# Patient Record
Sex: FEMALE | Race: WHITE | Employment: OTHER | URBAN - METROPOLITAN AREA
[De-identification: names, ages, dates, MRNs, and addresses within clinical notes are randomized per-mention and may not be internally consistent; named-entity substitution may affect disease eponyms.]

---

## 2018-01-15 ENCOUNTER — OFFICE VISIT (OUTPATIENT)
Dept: ENDOCRINOLOGY | Age: 58
End: 2018-01-15

## 2018-01-15 VITALS
DIASTOLIC BLOOD PRESSURE: 66 MMHG | WEIGHT: 168 LBS | HEIGHT: 66 IN | OXYGEN SATURATION: 98 % | SYSTOLIC BLOOD PRESSURE: 110 MMHG | HEART RATE: 95 BPM | BODY MASS INDEX: 27 KG/M2

## 2018-01-15 DIAGNOSIS — E04.2 MULTINODULAR GOITER: ICD-10-CM

## 2018-01-15 DIAGNOSIS — I10 ESSENTIAL HYPERTENSION: ICD-10-CM

## 2018-01-15 DIAGNOSIS — E55.9 VITAMIN D DEFICIENCY: ICD-10-CM

## 2018-01-15 DIAGNOSIS — E03.9 HYPOTHYROIDISM (ACQUIRED): Primary | ICD-10-CM

## 2018-01-15 DIAGNOSIS — E78.2 MIXED HYPERLIPIDEMIA: ICD-10-CM

## 2018-01-15 DIAGNOSIS — E66.3 OVERWEIGHT (BMI 25.0-29.9): ICD-10-CM

## 2018-01-15 DIAGNOSIS — E28.2 PCOS (POLYCYSTIC OVARIAN SYNDROME): ICD-10-CM

## 2018-01-15 PROCEDURE — 99203 OFFICE O/P NEW LOW 30 MIN: CPT | Performed by: INTERNAL MEDICINE

## 2018-01-15 RX ORDER — PANTOPRAZOLE SODIUM 40 MG/1
40 TABLET, DELAYED RELEASE ORAL
COMMUNITY

## 2018-01-15 RX ORDER — SIMVASTATIN 10 MG
10 TABLET ORAL
COMMUNITY

## 2018-01-15 RX ORDER — TRIAMTERENE AND HYDROCHLOROTHIAZIDE 37.5; 25 MG/1; MG/1
0.5 TABLET ORAL
COMMUNITY
Start: 2017-11-09

## 2018-01-15 RX ORDER — LEVOTHYROXINE AND LIOTHYRONINE 38; 9 UG/1; UG/1
60 TABLET ORAL
COMMUNITY
End: 2018-01-15 | Stop reason: SDUPTHER

## 2018-01-15 RX ORDER — VALSARTAN 160 MG/1
160 TABLET ORAL
COMMUNITY
Start: 2017-11-09 | End: 2020-06-15 | Stop reason: ALTCHOICE

## 2018-01-15 RX ORDER — CARVEDILOL 6.25 MG/1
TABLET ORAL
COMMUNITY
Start: 2018-01-15

## 2018-01-15 RX ORDER — LEVOTHYROXINE AND LIOTHYRONINE 38; 9 UG/1; UG/1
60 TABLET ORAL DAILY
Qty: 30 TABLET | Refills: 5 | Status: SHIPPED | OUTPATIENT
Start: 2018-01-15 | End: 2018-06-11 | Stop reason: SDUPTHER

## 2018-01-15 RX ORDER — LEVOTHYROXINE AND LIOTHYRONINE 9.5; 2.25 UG/1; UG/1
7.5 TABLET ORAL
COMMUNITY
End: 2018-01-15 | Stop reason: SDUPTHER

## 2018-01-15 RX ORDER — BUDESONIDE AND FORMOTEROL FUMARATE DIHYDRATE 160; 4.5 UG/1; UG/1
2 AEROSOL RESPIRATORY (INHALATION)
COMMUNITY

## 2018-01-15 RX ORDER — LEVOTHYROXINE AND LIOTHYRONINE 9.5; 2.25 UG/1; UG/1
7.5 TABLET ORAL DAILY
Qty: 30 TABLET | Refills: 5 | Status: SHIPPED | OUTPATIENT
Start: 2018-01-15 | End: 2018-06-03 | Stop reason: SDUPTHER

## 2018-01-15 RX ORDER — RANITIDINE 150 MG/1
150 TABLET ORAL
COMMUNITY
End: 2020-06-15 | Stop reason: ALTCHOICE

## 2018-01-15 ASSESSMENT — PATIENT HEALTH QUESTIONNAIRE - PHQ9
1. LITTLE INTEREST OR PLEASURE IN DOING THINGS: 0
SUM OF ALL RESPONSES TO PHQ9 QUESTIONS 1 & 2: 0
SUM OF ALL RESPONSES TO PHQ QUESTIONS 1-9: 0
2. FEELING DOWN, DEPRESSED OR HOPELESS: 0

## 2018-01-15 NOTE — PROGRESS NOTES
Hypothyroidism     PCOS (polycystic ovarian syndrome)      There are no active problems to display for this patient.     Past Surgical History:   Procedure Laterality Date     SECTION      HYSTERECTOMY, TOTAL ABDOMINAL       Family History   Problem Relation Age of Onset    Other Mother      metabolic encephalopathy     COPD Father    [employer] Father      prostate    Coronary Art Dis Brother     No Known Problems Son     No Known Problems Son      Social History     Social History    Marital status:      Spouse name: N/A    Number of children: N/A    Years of education: N/A     Social History Main Topics    Smoking status: Former Smoker    Smokeless tobacco: Former User    Alcohol use No    Drug use: No    Sexual activity: Yes     Partners: Male     Other Topics Concern    None     Social History Narrative    None     Current Outpatient Prescriptions   Medication Sig Dispense Refill    pantoprazole (PROTONIX) 40 MG tablet Take 40 mg by mouth      ranitidine (ZANTAC) 150 MG tablet Take 150 mg by mouth      thyroid (ARMOUR) 60 MG tablet Take 60 mg by mouth      metFORMIN (GLUCOPHAGE) 500 MG tablet Take 500 mg by mouth      carvedilol (COREG) 6.25 MG tablet TAKE 1 TABLET BY MOUTH TWICE DAILY      thyroid (ARMOUR) 15 MG tablet Take 7.5 mg by mouth      Multiple Vitamins-Minerals (MULTIVITAMIN ADULT PO) Take 1 tablet by mouth      triamterene-hydrochlorothiazide (MAXZIDE-25) 37.5-25 MG per tablet Take 0.5 tablets by mouth      valsartan (DIOVAN) 160 MG tablet Take 160 mg by mouth      simvastatin (ZOCOR) 10 MG tablet Take 10 mg by mouth      Calcium Carb-Cholecalciferol (CALCIUM CARBONATE-VITAMIN D3 PO) Take 2 tablets by mouth      budesonide-formoterol (SYMBICORT) 160-4.5 MCG/ACT AERO Inhale 2 puffs into the lungs      Estrogens, Conjugated (PREMARIN VA) Place vaginally      Minoxidil (ROGAINE EX) Apply topically      Polyethylene Glycol 3350 (MIRALAX PO) Take by mouth tablet; Refill: 5  - thyroid (ARMOUR) 60 MG tablet; Take 1 tablet by mouth daily  Dispense: 30 tablet; Refill: 5    2. Multinodular goiter    - US Head Neck Soft Tissue Thyroid; Future    3. Essential hypertension    - carvedilol (COREG) 6.25 MG tablet; TAKE 1 TABLET BY MOUTH TWICE DAILY  - triamterene-hydrochlorothiazide (MAXZIDE-25) 37.5-25 MG per tablet; Take 0.5 tablets by mouth  - valsartan (DIOVAN) 160 MG tablet; Take 160 mg by mouth    4. Mixed hyperlipidemia    - simvastatin (ZOCOR) 10 MG tablet; Take 10 mg by mouth  - Lipid Panel; Future    5. Overweight (BMI 25.0-29. 9)  Diet, exercise    6. Vitamin D deficiency  Continue vitamin D    7. PCOS (polycystic ovarian syndrome)    - metFORMIN (GLUCOPHAGE) 500 MG tablet; Take 1 tablet by mouth 2 times daily (with meals)  Dispense: 60 tablet; Refill: 5      Reviewed and/or ordered clinical lab results Yes  Reviewed and/or ordered radiology tests Yes   Reviewed and/or ordered other diagnostic tests No  Discussed test results with performing physician No  Independently reviewed image, tracing, or specimen No  Made a decision to obtain old records Yes  Reviewed and summarized old records Yes  Has hypothyroidism, on [place]. Obtained history from other than patient No    Mynor Cortez was counseled regarding symptoms of current diagnosis, course and complications of disease if inadequately treated, side effects of medications, diagnosis, treatment options, and prognosis, risks, benefits, complications, and alternatives of treatment, labs, imaging and other studies and treatment targets and goals. She understands instructions and counseling. Return in about 6 months (around 7/15/2018) for thyroid problems.

## 2018-04-09 ENCOUNTER — TELEPHONE (OUTPATIENT)
Dept: ENDOCRINOLOGY | Age: 58
End: 2018-04-09

## 2018-04-09 DIAGNOSIS — E28.2 PCOS (POLYCYSTIC OVARIAN SYNDROME): ICD-10-CM

## 2018-06-03 DIAGNOSIS — E03.9 HYPOTHYROIDISM (ACQUIRED): ICD-10-CM

## 2018-06-05 RX ORDER — THYROID,PORK 15 MG
TABLET ORAL
Qty: 15 TABLET | Refills: 0 | Status: SHIPPED | OUTPATIENT
Start: 2018-06-05 | End: 2018-06-11 | Stop reason: SDUPTHER

## 2018-06-11 ENCOUNTER — TELEPHONE (OUTPATIENT)
Dept: ENDOCRINOLOGY | Age: 58
End: 2018-06-11

## 2018-06-11 DIAGNOSIS — E03.9 HYPOTHYROIDISM (ACQUIRED): ICD-10-CM

## 2018-06-11 RX ORDER — THYROID,PORK 15 MG
TABLET ORAL
Qty: 15 TABLET | Refills: 0 | Status: CANCELLED | OUTPATIENT
Start: 2018-06-11

## 2018-06-11 RX ORDER — THYROID,PORK 15 MG
TABLET ORAL
Qty: 60 TABLET | Refills: 3 | Status: SHIPPED | OUTPATIENT
Start: 2018-06-11 | End: 2018-07-11 | Stop reason: SDUPTHER

## 2018-06-12 RX ORDER — THYROID 60 MG
TABLET ORAL
Qty: 90 TABLET | Refills: 0 | Status: SHIPPED | OUTPATIENT
Start: 2018-06-12 | End: 2018-07-11 | Stop reason: SDUPTHER

## 2018-07-11 ENCOUNTER — OFFICE VISIT (OUTPATIENT)
Dept: ENDOCRINOLOGY | Age: 58
End: 2018-07-11

## 2018-07-11 VITALS
HEIGHT: 66 IN | WEIGHT: 164.2 LBS | HEART RATE: 84 BPM | SYSTOLIC BLOOD PRESSURE: 105 MMHG | DIASTOLIC BLOOD PRESSURE: 60 MMHG | BODY MASS INDEX: 26.39 KG/M2 | TEMPERATURE: 98 F

## 2018-07-11 DIAGNOSIS — E55.9 VITAMIN D DEFICIENCY: ICD-10-CM

## 2018-07-11 DIAGNOSIS — E04.2 MULTINODULAR GOITER: ICD-10-CM

## 2018-07-11 DIAGNOSIS — E66.3 OVERWEIGHT (BMI 25.0-29.9): ICD-10-CM

## 2018-07-11 DIAGNOSIS — E78.2 MIXED HYPERLIPIDEMIA: ICD-10-CM

## 2018-07-11 DIAGNOSIS — I10 ESSENTIAL HYPERTENSION: ICD-10-CM

## 2018-07-11 DIAGNOSIS — E28.2 PCOS (POLYCYSTIC OVARIAN SYNDROME): ICD-10-CM

## 2018-07-11 DIAGNOSIS — E03.9 HYPOTHYROIDISM (ACQUIRED): Primary | ICD-10-CM

## 2018-07-11 PROCEDURE — 99214 OFFICE O/P EST MOD 30 MIN: CPT | Performed by: INTERNAL MEDICINE

## 2018-07-11 RX ORDER — THYROID,PORK 15 MG
TABLET ORAL
Qty: 90 TABLET | Refills: 3 | Status: SHIPPED | OUTPATIENT
Start: 2018-07-11 | End: 2019-08-02 | Stop reason: SDUPTHER

## 2018-07-11 RX ORDER — THYROID 60 MG
TABLET ORAL
Qty: 90 TABLET | Refills: 3 | Status: SHIPPED | OUTPATIENT
Start: 2018-07-11 | End: 2019-09-09 | Stop reason: SDUPTHER

## 2018-07-11 NOTE — PROGRESS NOTES
syndrome)      Patient Active Problem List    Diagnosis Date Noted    Hypothyroidism (acquired) 01/15/2018    Multinodular goiter 01/15/2018    Essential hypertension 01/15/2018    Mixed hyperlipidemia 01/15/2018    Overweight (BMI 25.0-29.9) 01/15/2018    Vitamin D deficiency 01/15/2018    PCOS (polycystic ovarian syndrome) 01/15/2018     Past Surgical History:   Procedure Laterality Date     SECTION      HYSTERECTOMY, TOTAL ABDOMINAL       Family History   Problem Relation Age of Onset    Other Mother         metabolic encephalopathy     COPD Father    [employer] Cancer Father         prostate    Coronary Art Dis Brother     No Known Problems Son     No Known Problems Son      Social History     Social History    Marital status:      Spouse name: N/A    Number of children: N/A    Years of education: N/A     Social History Main Topics    Smoking status: Former Smoker    Smokeless tobacco: Former User    Alcohol use No    Drug use: No    Sexual activity: Yes     Partners: Male     Other Topics Concern    None     Social History Narrative    None     Current Outpatient Prescriptions   Medication Sig Dispense Refill    metFORMIN (GLUCOPHAGE) 1000 MG tablet Take 1,000 mg by mouth 2 times daily (with meals)      ARMOUR THYROID 60 MG tablet TAKE ONE TABLET BY MOUTH DAILY 90 tablet 3    ARMOUR THYROID 15 MG tablet TAKE 1/2 TAB BY MOUTH DAILY 90 tablet 3    pantoprazole (PROTONIX) 40 MG tablet Take 40 mg by mouth      ranitidine (ZANTAC) 150 MG tablet Take 150 mg by mouth      carvedilol (COREG) 6.25 MG tablet TAKE 1 TABLET BY MOUTH TWICE DAILY      Multiple Vitamins-Minerals (MULTIVITAMIN ADULT PO) Take 1 tablet by mouth      triamterene-hydrochlorothiazide (MAXZIDE-25) 37.5-25 MG per tablet Take 0.5 tablets by mouth      valsartan (DIOVAN) 160 MG tablet Take 160 mg by mouth      simvastatin (ZOCOR) 10 MG tablet Take 10 mg by mouth      Calcium Carb-Cholecalciferol (CALCIUM

## 2019-05-07 ENCOUNTER — TELEPHONE (OUTPATIENT)
Dept: ENDOCRINOLOGY | Age: 59
End: 2019-05-07

## 2019-05-16 NOTE — TELEPHONE ENCOUNTER
LOV: 7/11/18  NOV: 7/12/19      Dose: Take 1,000 mg by mouth 2 times daily  Strength: 1000 mg  Route: Oral

## 2019-05-20 NOTE — TELEPHONE ENCOUNTER
Pt states that she is taking Metformin 500 MG 1 tab BID.  Pt states she used to have 1000 MG and split in in half

## 2019-06-06 ENCOUNTER — TELEPHONE (OUTPATIENT)
Dept: ENDOCRINOLOGY | Age: 59
End: 2019-06-06

## 2019-08-02 DIAGNOSIS — E03.9 HYPOTHYROIDISM (ACQUIRED): ICD-10-CM

## 2019-08-02 RX ORDER — THYROID,PORK 15 MG
TABLET ORAL
Qty: 15 TABLET | Refills: 2 | Status: SHIPPED | OUTPATIENT
Start: 2019-08-02 | End: 2019-09-09 | Stop reason: SDUPTHER

## 2019-09-04 ENCOUNTER — TELEPHONE (OUTPATIENT)
Dept: ENDOCRINOLOGY | Age: 59
End: 2019-09-04

## 2019-09-04 DIAGNOSIS — E55.9 VITAMIN D DEFICIENCY: Primary | ICD-10-CM

## 2019-09-04 DIAGNOSIS — E28.2 PCOS (POLYCYSTIC OVARIAN SYNDROME): ICD-10-CM

## 2019-09-04 DIAGNOSIS — E03.9 HYPOTHYROIDISM (ACQUIRED): ICD-10-CM

## 2019-09-04 DIAGNOSIS — E78.2 MIXED HYPERLIPIDEMIA: ICD-10-CM

## 2019-09-09 ENCOUNTER — OFFICE VISIT (OUTPATIENT)
Dept: ENDOCRINOLOGY | Age: 59
End: 2019-09-09
Payer: COMMERCIAL

## 2019-09-09 VITALS
HEIGHT: 66 IN | BODY MASS INDEX: 26.36 KG/M2 | DIASTOLIC BLOOD PRESSURE: 77 MMHG | HEART RATE: 81 BPM | SYSTOLIC BLOOD PRESSURE: 120 MMHG | OXYGEN SATURATION: 98 % | WEIGHT: 164 LBS

## 2019-09-09 DIAGNOSIS — E03.9 HYPOTHYROIDISM (ACQUIRED): Primary | ICD-10-CM

## 2019-09-09 DIAGNOSIS — I10 ESSENTIAL HYPERTENSION: ICD-10-CM

## 2019-09-09 DIAGNOSIS — E28.2 PCOS (POLYCYSTIC OVARIAN SYNDROME): ICD-10-CM

## 2019-09-09 DIAGNOSIS — E66.3 OVERWEIGHT (BMI 25.0-29.9): ICD-10-CM

## 2019-09-09 DIAGNOSIS — E78.2 MIXED HYPERLIPIDEMIA: ICD-10-CM

## 2019-09-09 DIAGNOSIS — E04.2 MULTINODULAR GOITER: ICD-10-CM

## 2019-09-09 DIAGNOSIS — E55.9 VITAMIN D DEFICIENCY: ICD-10-CM

## 2019-09-09 PROCEDURE — 99214 OFFICE O/P EST MOD 30 MIN: CPT | Performed by: INTERNAL MEDICINE

## 2019-09-09 PROCEDURE — G8419 CALC BMI OUT NRM PARAM NOF/U: HCPCS | Performed by: INTERNAL MEDICINE

## 2019-09-09 PROCEDURE — 3017F COLORECTAL CA SCREEN DOC REV: CPT | Performed by: INTERNAL MEDICINE

## 2019-09-09 PROCEDURE — G8427 DOCREV CUR MEDS BY ELIG CLIN: HCPCS | Performed by: INTERNAL MEDICINE

## 2019-09-09 PROCEDURE — 1036F TOBACCO NON-USER: CPT | Performed by: INTERNAL MEDICINE

## 2019-09-09 RX ORDER — THYROID 60 MG
TABLET ORAL
Qty: 30 TABLET | Refills: 11 | Status: SHIPPED | OUTPATIENT
Start: 2019-09-09 | End: 2020-09-08

## 2019-09-09 RX ORDER — THYROID,PORK 15 MG
TABLET ORAL
Qty: 15 TABLET | Refills: 11 | Status: SHIPPED | OUTPATIENT
Start: 2019-09-09 | End: 2020-06-15 | Stop reason: DRUGHIGH

## 2019-09-09 NOTE — PROGRESS NOTES
tendency for easy bleeding, no swollen lymph nodes, no tendency for easy bruising  Immunology: no seasonal allergies, no frequent infections, no frequent illnesses  Endocrine: no temperature intolerance, has hot flashes, no hand tremor    OBJECTIVE:   /77 (Site: Left Upper Arm, Position: Sitting, Cuff Size: Medium Adult)   Pulse 81   Ht 5' 6\" (1.676 m)   Wt 164 lb (74.4 kg)   SpO2 98%   BMI 26.47 kg/m²   Wt Readings from Last 3 Encounters:   09/09/19 164 lb (74.4 kg)   07/11/18 164 lb 3.2 oz (74.5 kg)   01/15/18 168 lb (76.2 kg)       Physical Exam:  Constitutional: no acute distress, well appearing, well nourished  Psychiatric: oriented to person, place and time, judgement, insight and normal, recent and remote memory and intact and mood, affect are normal  Skin: skin and subcutaneous tissue is normal without mass, normal turgor  Head and Face: examination of head and face revealed no abnormalities  Eyes: no lid or conjunctival swelling, no erythema or discharge, pupils are normal, equal, round, and reactive to light  Ears/Nose: external inspection of ears and nose revealed no abnormalities, hearing is grossly normal  Oropharynx/Mouth/Face: lips, tongue and gums are normal with no lesions, the voice quality was normal  Neck: neck is supple and symmetric, with midline trachea and no masses, thyroid is normal  Lymphatics: normal cervical lymph nodes, normal supraclavicular nodes  Pulmonary: no increased work of breathing or signs of respiratory distress, lungs are clear to auscultation  Cardiovascular: normal heart rate and rhythm, normal S1 and S2, no murmurs and pedal pulses and 2+ bilaterally, No edema  Abdomen: abdomen is soft, non-tender with no masses  Musculoskeletal: normal gait and station, exam of the digits and nails are normal  Neurological: normal coordination, normal general cortical function    Lab Review:  No results found for: TSH  No results found for: FREET4     ASSESSMENT/PLAN:  1. Hypothyroidism (acquired)  TSH 1.6  - TSH without Reflex; Future  - T4, Free; Future  - Comprehensive Metabolic Panel; Future  - thyroid (ARMOUR) 15 MG tablet; Take 0.5 tablets by mouth daily  Dispense: 30 tablet; Refill: 5  - thyroid (ARMOUR) 60 MG tablet; Take 1 tablet by mouth daily  Dispense: 30 tablet; Refill: 5    2. Multinodular goiter  Small nodules and cysts  - US Head Neck Soft Tissue Thyroid; Future    3. Essential hypertension    - carvedilol (COREG) 6.25 MG tablet; TAKE 1 TABLET BY MOUTH TWICE DAILY  - triamterene-hydrochlorothiazide (MAXZIDE-25) 37.5-25 MG per tablet; Take 0.5 tablets by mouth  - valsartan (DIOVAN) 160 MG tablet; Take 160 mg by mouth    4. Mixed hyperlipidemia  LDL 86  - simvastatin (ZOCOR) 10 MG tablet; Take 10 mg by mouth  - Lipid Panel; Future    5. Overweight (BMI 25.0-29. 9)  Diet, exercise    6. Vitamin D deficiency  25OHvitamin D 42  Continue vitamin D    7. PCOS (polycystic ovarian syndrome)  HbA1C 5.6  - metFORMIN (GLUCOPHAGE) 500 MG tablet; Take 1 tablet by mouth 2 times daily (with meals)  Dispense: 60 tablet; Refill: 5      Reviewed and/or ordered clinical lab results Yes  Reviewed and/or ordered radiology tests Yes   Reviewed and/or ordered other diagnostic tests No  Discussed test results with performing physician No  Independently reviewed image, tracing, or specimen No  Made a decision to obtain old records Yes  Reviewed and summarized old records Yes  Has hypothyroidism, on [place]. Obtained history from other than patient No    Svetlana Janellealeena was counseled regarding symptoms of thyroid  diagnosis, course and complications of disease if inadequately treated, side effects of medications, diagnosis, treatment options, and prognosis, risks, benefits, complications, and alternatives of treatment, labs, imaging and other studies and treatment targets and goals. She understands instructions and counseling. Return in about 1 year (around 9/9/2020) for thyroid problems.

## 2020-06-04 ENCOUNTER — TELEPHONE (OUTPATIENT)
Dept: ENDOCRINOLOGY | Age: 60
End: 2020-06-04

## 2020-06-15 ENCOUNTER — OFFICE VISIT (OUTPATIENT)
Dept: ENDOCRINOLOGY | Age: 60
End: 2020-06-15
Payer: COMMERCIAL

## 2020-06-15 VITALS
HEART RATE: 74 BPM | WEIGHT: 144.6 LBS | SYSTOLIC BLOOD PRESSURE: 129 MMHG | TEMPERATURE: 97.2 F | DIASTOLIC BLOOD PRESSURE: 84 MMHG | BODY MASS INDEX: 23.24 KG/M2 | HEIGHT: 66 IN | OXYGEN SATURATION: 99 %

## 2020-06-15 PROCEDURE — G8427 DOCREV CUR MEDS BY ELIG CLIN: HCPCS | Performed by: INTERNAL MEDICINE

## 2020-06-15 PROCEDURE — 99214 OFFICE O/P EST MOD 30 MIN: CPT | Performed by: INTERNAL MEDICINE

## 2020-06-15 PROCEDURE — 3017F COLORECTAL CA SCREEN DOC REV: CPT | Performed by: INTERNAL MEDICINE

## 2020-06-15 PROCEDURE — 1036F TOBACCO NON-USER: CPT | Performed by: INTERNAL MEDICINE

## 2020-06-15 PROCEDURE — G8420 CALC BMI NORM PARAMETERS: HCPCS | Performed by: INTERNAL MEDICINE

## 2020-06-15 NOTE — PROGRESS NOTES
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 FINDINGS:    *  Right thyroid lobe:   Heterogeneous parenchyma. Lobe measures 3.8 x 1.0 x 0.7 cm. *  Nodules/Cysts:  1.  0.5 x 0.3 x 0.3 cm ovoid circumscribed hyperechoic nodule. *  Left thyroid lobe:  Heterogeneous parenchyma. Lobe measures 3.0 x 0.8 x 1.1 cm. *  Nodules/Cysts:  1.  0.7 x 0.6 x 0.2 cm ovoid circumscribed cystic lesion with no suspicious features. 2.  0.4 x 0.2 x 0.2 cm ovoid circumscribed cystic lesion with no suspicious features. 3.  0.2 x 0.2 x 0.2 cm ovoid circumscribed cystic lesion. *  Thyroid isthmus:  2 mm in thickness. *  Nodules/Cysts:  1. No nodule or cystic lesion. IMPRESSION:   Right thyroid lobe hyperechoic nodule. Left thyroid gland grossly benign cysts. Thyroid gland size is within normal limits.            Past Medical History:   Diagnosis Date    Hypertension     Hypothyroidism     PCOS (polycystic ovarian syndrome)      Patient Active Problem List    Diagnosis Date Noted    Hypothyroidism (acquired) 01/15/2018    Multinodular goiter 01/15/2018    Essential hypertension 01/15/2018    Mixed hyperlipidemia 01/15/2018    Overweight (BMI 25.0-29.9) 01/15/2018    Vitamin D deficiency 01/15/2018    PCOS (polycystic ovarian syndrome) 01/15/2018     Past Surgical History:   Procedure Laterality Date     SECTION      HYSTERECTOMY, TOTAL ABDOMINAL       Family History   Problem Relation Age of Onset    Other Mother         metabolic encephalopathy     COPD Father     Cancer Father         prostate    Coronary Art Dis Brother     No Known Problems Son     No Known Problems Son      Social History     Socioeconomic History    Marital status:      Spouse name: None    Number of children: None    Years of education: None    Highest education level: None   Occupational History    None   Social Needs    Financial resource strain: None    Food insecurity     Worry: None     Inability: None    Transportation needs     Medical: None     Non-medical: None   Tobacco Use    Smoking status: Former Smoker    Smokeless tobacco: Former User   Substance and Sexual Activity    Alcohol use: No    Drug use: No    Sexual activity: Yes     Partners: Male   Lifestyle    Physical activity     Days per week: None     Minutes per session: None    Stress: None   Relationships    Social connections     Talks on phone: None     Gets together: None     Attends [employer] service: None     Active member of club or organization: None     Attends meetings of clubs or organizations: None     Relationship status: None    Intimate partner violence     Fear of current or ex partner: None     Emotionally abused: None     Physically abused: None     Forced sexual activity: None   Other Topics Concern    None   Social History Narrative    None     Current Outpatient Medications   Medication Sig Dispense Refill    metFORMIN (GLUCOPHAGE) 500 MG tablet TAKE ONE TABLET BY MOUTH TWICE DAILY WITH FOOD 180 tablet 3    ARMOUR THYROID 60 MG tablet TAKE ONE TABLET BY MOUTH DAILY 30 tablet 11    pantoprazole (PROTONIX) 40 MG tablet Take 40 mg by mouth 3 days per week      carvedilol (COREG) 6.25 MG tablet TAKE 1 TABLET BY MOUTH TWICE DAILY      Multiple Vitamins-Minerals (MULTIVITAMIN ADULT PO) Take 1 tablet by mouth      triamterene-hydrochlorothiazide (MAXZIDE-25) 37.5-25 MG per tablet Take 0.5 tablets by mouth      simvastatin (ZOCOR) 10 MG tablet Take 10 mg by mouth      Calcium Carb-Cholecalciferol (CALCIUM CARBONATE-VITAMIN D3 PO) Take 2 tablets by mouth      budesonide-formoterol (SYMBICORT) 160-4.5 MCG/ACT AERO Inhale 2 puffs into the lungs      Estrogens, Conjugated (PREMARIN VA) Place vaginally      Minoxidil (ROGAINE EX) Apply topically      Polyethylene Glycol 3350 (MIRALAX PO) Take by intolerance, has hot flashes, no hand tremor    OBJECTIVE:   /84 (Site: Left Upper Arm, Position: Sitting, Cuff Size: Medium Adult)   Pulse 74   Temp 97.2 °F (36.2 °C) (Infrared)   Ht 5' 6\" (1.676 m)   Wt 144 lb 9.6 oz (65.6 kg)   SpO2 99%   BMI 23.34 kg/m²   Wt Readings from Last 3 Encounters:   06/15/20 144 lb 9.6 oz (65.6 kg)   09/09/19 164 lb (74.4 kg)   07/11/18 164 lb 3.2 oz (74.5 kg)       Physical Exam:  Constitutional: no acute distress, well appearing, well nourished  Psychiatric: oriented to person, place and time, judgement, insight and normal, recent and remote memory and intact and mood, affect are normal  Skin: skin and subcutaneous tissue is normal without mass, normal turgor  Head and Face: examination of head and face revealed no abnormalities  Eyes: no lid or conjunctival swelling, no erythema or discharge, pupils are normal, equal, round, and reactive to light  Ears/Nose: external inspection of ears and nose revealed no abnormalities, hearing is grossly normal  Oropharynx/Mouth/Face: lips, tongue and gums are normal with no lesions, the voice quality was normal  Neck: neck is supple and symmetric, with midline trachea and no masses, thyroid is normal  Lymphatics: normal cervical lymph nodes, normal supraclavicular nodes  Pulmonary: no increased work of breathing or signs of respiratory distress, lungs are clear to auscultation  Cardiovascular: normal heart rate and rhythm, normal S1 and S2, no murmurs and pedal pulses and 2+ bilaterally, No edema  Abdomen: abdomen is soft, non-tender with no masses  Musculoskeletal: normal gait and station, exam of the digits and nails are normal  Neurological: normal coordination, normal general cortical function    Lab Review:  No results found for: TSH  No results found for: FREET4     ASSESSMENT/PLAN:  1. Hypothyroidism (acquired)  Decrease [place] to 60 mg daily  TSH 1.6-0.308  - TSH without Reflex;  Future  - T4, Free; Future  - Comprehensive Metabolic Panel; Future    2. Multinodular goiter  Small nodules and cysts  - US Head Neck Soft Tissue Thyroid; Future    3. Essential hypertension    - carvedilol (COREG) 6.25 MG tablet; TAKE 1 TABLET BY MOUTH TWICE DAILY  - triamterene-hydrochlorothiazide (MAXZIDE-25) 37.5-25 MG per tablet; Take 0.5 tablets by mouth    4. Mixed hyperlipidemia  LDL 86-89  - simvastatin (ZOCOR) 10 MG tablet; Take 10 mg by mouth  - Lipid Panel; Future    5. Overweight (BMI 25.0-29. 9)  Diet, exercise    6. Vitamin D deficiency  25OHvitamin D 42  Continue vitamin D    7. PCOS (polycystic ovarian syndrome)  HbA1C 5.6-5.7  Glucose 88  Hemoglobin A1c 5.7  Glucose 88  Continue metformin 500 mg twice a day    Reviewed and/or ordered clinical lab results Yes  Reviewed and/or ordered radiology tests Yes   Reviewed and/or ordered other diagnostic tests No  Discussed test results with performing physician No  Independently reviewed image, tracing, or specimen No  Made a decision to obtain old records Yes  Reviewed and summarized old records Yes  Has hypothyroidism, on [place]. Obtained history from other than patient No    Brissa Jackman was counseled regarding symptoms of thyroid  diagnosis, course and complications of disease if inadequately treated, side effects of medications, diagnosis, treatment options, and prognosis, risks, benefits, complications, and alternatives of treatment, labs, imaging and other studies and treatment targets and goals. She understands instructions and counseling. Return in about 3 months (around 9/15/2020) for thyroid problems.

## 2020-06-30 ENCOUNTER — TELEPHONE (OUTPATIENT)
Dept: ENDOCRINOLOGY | Age: 60
End: 2020-06-30

## 2020-06-30 NOTE — TELEPHONE ENCOUNTER
PT called stated that she recently had one of her thyroid meds removed because thyroid was tested as low by Dr. Marshal Logan and is now having issues with lethargy. Requests a call back.

## 2020-07-01 RX ORDER — THYROID,PORK 15 MG
TABLET ORAL
Qty: 15 TABLET | Refills: 11
Start: 2020-07-01 | End: 2020-09-08

## 2020-07-01 NOTE — TELEPHONE ENCOUNTER
I left patient a message that I will call back. Will resume [place] Thyroid previous dose and recheck blood test in 3 months.

## 2020-07-02 NOTE — TELEPHONE ENCOUNTER
Spoke with patient. Will go back to previous dose. Please clarify if she was taking only half tablet of 15 mg [place] Thyroid in addition to 60 mg. Ask if she needs refills on [place] Thyroid.

## 2020-09-08 ENCOUNTER — OFFICE VISIT (OUTPATIENT)
Dept: ENDOCRINOLOGY | Age: 60
End: 2020-09-08
Payer: COMMERCIAL

## 2020-09-08 VITALS
OXYGEN SATURATION: 99 % | BODY MASS INDEX: 22.02 KG/M2 | HEART RATE: 84 BPM | HEIGHT: 66 IN | TEMPERATURE: 97.4 F | RESPIRATION RATE: 14 BRPM | WEIGHT: 137 LBS | DIASTOLIC BLOOD PRESSURE: 88 MMHG | SYSTOLIC BLOOD PRESSURE: 120 MMHG

## 2020-09-08 PROCEDURE — 3017F COLORECTAL CA SCREEN DOC REV: CPT | Performed by: INTERNAL MEDICINE

## 2020-09-08 PROCEDURE — G8427 DOCREV CUR MEDS BY ELIG CLIN: HCPCS | Performed by: INTERNAL MEDICINE

## 2020-09-08 PROCEDURE — G8420 CALC BMI NORM PARAMETERS: HCPCS | Performed by: INTERNAL MEDICINE

## 2020-09-08 PROCEDURE — 1036F TOBACCO NON-USER: CPT | Performed by: INTERNAL MEDICINE

## 2020-09-08 PROCEDURE — 99214 OFFICE O/P EST MOD 30 MIN: CPT | Performed by: INTERNAL MEDICINE

## 2020-09-08 RX ORDER — THYROID 60 MG
TABLET ORAL
Qty: 90 TABLET | Refills: 1 | Status: SHIPPED | OUTPATIENT
Start: 2020-09-08 | End: 2021-02-01 | Stop reason: SDUPTHER

## 2020-09-08 RX ORDER — THYROID,PORK 15 MG
TABLET ORAL
Qty: 90 TABLET | Refills: 1 | Status: SHIPPED | OUTPATIENT
Start: 2020-09-08 | End: 2021-02-01 | Stop reason: SDUPTHER

## 2020-09-08 NOTE — PROGRESS NOTES
SUBJECTIVE:  Sabina Apodaca is a 64 y.o. female who is here for hypothyroidism. 1. Hypothyroidism (acquired)    This started in 2012. Patient was diagnosed with hypothyroidism. The problem has been unchanged. Patient started medication in 2012. Currently patient is on: [place] 60 mg plus 1/2 tab of 15 mg. Misses  0 doses a month. Feels better gluten free  Current complaints: denies fatigue, weight changes, cold intolerance. Constipation resolved when gluten free. 2. Multinodular goiter    History of obstructive symptoms: difficulty swallowing No, changes in voice/hoarseness No.  History of radiation to patient's neck: No  Resent iodine exposure: No  Family history includes no thyroid abnormalities. Family history of thyroid cancer: No    3. Essential hypertension  No headaches. 4. Mixed hyperlipidemia  No muscle pain. 5. Vitamin D deficiency  No bone pain. Gluten free, fells better    6. PCOS  Has hirsutism  ULTRASOUND THYROID, 6/23/2020 10:58 AM.         INDICATION: Thyroid nodules.         COMPARISON: Report without images, performed at [facility] on 5/30/2019.         TECHNIQUE: Multiple transverse and longitudinal gray scale images were obtained through the    thyroid gland.         FINDINGS:         Right lobe measures 1.1 x 0.9 x 3.7 cm. Left lobe measures 0.9 x 1.1 x 3.3 cm. Isthmus is 1.3    mm in thickness.         Thyroid gland is heterogeneous in echotexture without suspicious nodule. No nodules are seen    within the right lobe or the isthmus. There are 2 small cystic lesions within the left lobe. Largest nodule in the left lobe is nearly entirely cystic with a small focus of mural based    calcification and measures 5 x 6 x 7 mm.              IMPRESSION:         NO SOLID OR SUSPICIOUS LESION IDENTIFIED. 2 SMALL CYSTIC NODULES IN LEFT LOBE.               Right thyroid lobe hyperechoic nodule. Left thyroid gland grossly benign cysts.  Thyroid gland size is within normal limits. US-THYROID / NECK:    DEMOGRAPHICS: 64years old Female    Clinical indication:   E04.2: Nontoxic multinodular goiter    Comparison: 6/5/2017-7/26/2012. FINDINGS:    *  Right thyroid lobe:   Heterogeneous parenchyma. Lobe measures 3.8 x 1.0 x 0.7 cm. *  Nodules/Cysts:  1.  0.5 x 0.3 x 0.3 cm ovoid circumscribed hyperechoic nodule.       *  Left thyroid lobe:  Heterogeneous parenchyma. Lobe measures 3.0 x 0.8 x 1.1 cm.    *  Nodules/Cysts:  1.  0.7 x 0.6 x 0.2 cm ovoid circumscribed cystic lesion with no suspicious features. 2.  0.4 x 0.2 x 0.2 cm ovoid circumscribed cystic lesion with no suspicious features. 3.  0.2 x 0.2 x 0.2 cm ovoid circumscribed cystic lesion. *  Thyroid isthmus:  2 mm in thickness.     *  Nodules/Cysts:  1.  No nodule or cystic lesion. Other Result Information   Latrell, External Ris In - 05/30/2019  5:12 PM EDT  US-THYROID / NECK:    DEMOGRAPHICS: 64years old Female    Clinical indication:   E04.2: Nontoxic multinodular goiter    Comparison: 6/5/2017-7/26/2012. FINDINGS:    *  Right thyroid lobe:   Heterogeneous parenchyma. Lobe measures 3.8 x 1.0 x 0.7 cm. *  Nodules/Cysts:  1.  0.5 x 0.3 x 0.3 cm ovoid circumscribed hyperechoic nodule. *  Left thyroid lobe:  Heterogeneous parenchyma. Lobe measures 3.0 x 0.8 x 1.1 cm. *  Nodules/Cysts:  1.  0.7 x 0.6 x 0.2 cm ovoid circumscribed cystic lesion with no suspicious features. 2.  0.4 x 0.2 x 0.2 cm ovoid circumscribed cystic lesion with no suspicious features. 3.  0.2 x 0.2 x 0.2 cm ovoid circumscribed cystic lesion. *  Thyroid isthmus:  2 mm in thickness. *  Nodules/Cysts:  1. No nodule or cystic lesion. IMPRESSION:   Right thyroid lobe hyperechoic nodule. Left thyroid gland grossly benign cysts. Thyroid gland size is within normal limits.            Past Medical History:   Diagnosis Date    Hypertension     Hypothyroidism     PCOS (polycystic ovarian syndrome)      Patient Active Problem List Diagnosis Date Noted    Hypothyroidism (acquired) 01/15/2018    Multinodular goiter 01/15/2018    Essential hypertension 01/15/2018    Mixed hyperlipidemia 01/15/2018    Overweight (BMI 25.0-29.9) 01/15/2018    Vitamin D deficiency 01/15/2018    PCOS (polycystic ovarian syndrome) 01/15/2018     Past Surgical History:   Procedure Laterality Date     SECTION      HYSTERECTOMY, TOTAL ABDOMINAL       Family History   Problem Relation Age of Onset    Other Mother         metabolic encephalopathy     COPD Father    Gladystine Mower Cancer Father         prostate    Coronary Art Dis Brother     No Known Problems Son     No Known Problems Son      Social History     Socioeconomic History    Marital status:      Spouse name: None    Number of children: None    Years of education: None    Highest education level: None   Occupational History    None   Social Needs    Financial resource strain: None    Food insecurity     Worry: None     Inability: None    Transportation needs     Medical: None     Non-medical: None   Tobacco Use    Smoking status: Former Smoker     Packs/day: 0.50     Years: 10.00     Pack years: 5.00     Types: Cigarettes     Last attempt to quit: 1985     Years since quittin.0    Smokeless tobacco: Former User   Substance and Sexual Activity    Alcohol use: No    Drug use: No    Sexual activity: Yes     Partners: Male   Lifestyle    Physical activity     Days per week: None     Minutes per session: None    Stress: None   Relationships    Social connections     Talks on phone: None     Gets together: None     Attends [employer] service: None     Active member of club or organization: None     Attends meetings of clubs or organizations: None     Relationship status: None    Intimate partner violence     Fear of current or ex partner: None     Emotionally abused: None     Physically abused: None     Forced sexual activity: None   Other Topics Concern    None   Social History Narrative    None     Current Outpatient Medications   Medication Sig Dispense Refill    CLARITZA THYROID 15 MG tablet TAKE 1/2 TABLET BY MOUTH DAILY 90 tablet 1    ARMOUR THYROID 60 MG tablet TAKE ONE TABLET BY MOUTH DAILY 90 tablet 1    metFORMIN (GLUCOPHAGE) 500 MG tablet TAKE half TABLET BY MOUTH TWICE DAILY WITH FOOD 180 tablet 1    pantoprazole (PROTONIX) 40 MG tablet Take 40 mg by mouth 3 days per week      carvedilol (COREG) 6.25 MG tablet TAKE 1 TABLET BY MOUTH TWICE DAILY      Multiple Vitamins-Minerals (MULTIVITAMIN ADULT PO) Take 1 tablet by mouth      triamterene-hydrochlorothiazide (MAXZIDE-25) 37.5-25 MG per tablet Take 0.5 tablets by mouth      simvastatin (ZOCOR) 10 MG tablet Take 10 mg by mouth      Calcium Carb-Cholecalciferol (CALCIUM CARBONATE-VITAMIN D3 PO) Take 2 tablets by mouth      budesonide-formoterol (SYMBICORT) 160-4.5 MCG/ACT AERO Inhale 2 puffs into the lungs      Estrogens, Conjugated (PREMARIN VA) Place vaginally      Minoxidil (ROGAINE EX) Apply topically      Polyethylene Glycol 3350 (MIRALAX PO) Take by mouth       No current facility-administered medications for this visit.       Allergies   Allergen Reactions    Septra [Sulfamethoxazole-Trimethoprim] Itching     Family Status   Relation Name Status    Mother      Father  Alive   [facility] Brother  Alive    Son  Alive    Son  Alive       Review of Systems:  Constitutional: no fatigue, no fever, no recent weight gain, no recent weight loss, no changes in appetite  Eyes: no eye pain, no change in vision, no eye redness, no eye irritation, no double vision  Ears, nose, throat: no nasal congestion, no sore throat, no earache, no decrease in hearing, no hoarseness, no dry mouth, no sinus problems, no difficulty swallowing, no neck lumps, no dental problems, no mouth sores, no ringing in ears  Pulmonary: no shortness of breath, no wheezing, no dyspnea on exertion, has cough  Cardiovascular: no chest pain, no decision to obtain old records Yes  Reviewed and summarized old records Yes  Has hypothyroidism, on [place]. Obtained history from other than patient No    Keven George was counseled regarding symptoms of thyroid  diagnosis, course and complications of disease if inadequately treated, side effects of medications, diagnosis, treatment options, and prognosis, risks, benefits, complications, and alternatives of treatment, labs, imaging and other studies and treatment targets and goals. She understands instructions and counseling. Return in about 1 year (around 9/8/2021) for thyroid problems.

## 2021-02-01 DIAGNOSIS — E55.9 VITAMIN D DEFICIENCY: ICD-10-CM

## 2021-02-01 DIAGNOSIS — E03.9 HYPOTHYROIDISM (ACQUIRED): ICD-10-CM

## 2021-02-01 DIAGNOSIS — E78.2 MIXED HYPERLIPIDEMIA: ICD-10-CM

## 2021-02-01 DIAGNOSIS — E28.2 PCOS (POLYCYSTIC OVARIAN SYNDROME): ICD-10-CM

## 2021-02-01 DIAGNOSIS — E04.2 MULTINODULAR GOITER: ICD-10-CM

## 2021-02-01 RX ORDER — THYROID 60 MG
TABLET ORAL
Qty: 90 TABLET | Refills: 2 | Status: SHIPPED | OUTPATIENT
Start: 2021-02-01 | End: 2021-09-07

## 2021-02-01 RX ORDER — THYROID,PORK 15 MG
TABLET ORAL
Qty: 90 TABLET | Refills: 2 | Status: SHIPPED | OUTPATIENT
Start: 2021-02-01 | End: 2021-09-07

## 2021-02-01 NOTE — TELEPHONE ENCOUNTER
Requested Prescriptions     Pending Prescriptions Disp Refills    CLARITZA THYROID 15 MG tablet 90 tablet 1     Sig: TAKE 1/2 TABLET BY MOUTH DAILY    CLARITZA THYROID 60 MG tablet 90 tablet 1     Sig: TAKE ONE TABLET BY MOUTH DAILY         Last ov: 9/8/2020  Next ov: 9/7/2021

## 2021-06-14 ENCOUNTER — TELEPHONE (OUTPATIENT)
Dept: ENDOCRINOLOGY | Age: 61
End: 2021-06-14

## 2021-06-15 NOTE — TELEPHONE ENCOUNTER
Please inform patient that her thyroid ultrasound showed a left nodule slightly increased in size, but still very small, only 0.9 cm, too small for biopsy. Recommend continue follow up with ultrasound.

## 2021-09-06 NOTE — PROGRESS NOTES
and measures 5 x 6 x 7 mm.              IMPRESSION:         NO SOLID OR SUSPICIOUS LESION IDENTIFIED. 2 SMALL CYSTIC NODULES IN LEFT LOBE.               Right thyroid lobe hyperechoic nodule. Left thyroid gland grossly benign cysts. Thyroid gland size is within normal limits. US-THYROID / NECK:    DEMOGRAPHICS: 64years old Female    Clinical indication:   E04.2: Nontoxic multinodular goiter    Comparison: 6/5/2017-7/26/2012. FINDINGS:    *  Right thyroid lobe:   Heterogeneous parenchyma. Lobe measures 3.8 x 1.0 x 0.7 cm. *  Nodules/Cysts:  1.  0.5 x 0.3 x 0.3 cm ovoid circumscribed hyperechoic nodule.       *  Left thyroid lobe:  Heterogeneous parenchyma. Lobe measures 3.0 x 0.8 x 1.1 cm.    *  Nodules/Cysts:  1.  0.7 x 0.6 x 0.2 cm ovoid circumscribed cystic lesion with no suspicious features. 2.  0.4 x 0.2 x 0.2 cm ovoid circumscribed cystic lesion with no suspicious features. 3.  0.2 x 0.2 x 0.2 cm ovoid circumscribed cystic lesion. *  Thyroid isthmus:  2 mm in thickness.     *  Nodules/Cysts:  1.  No nodule or cystic lesion. Other Result Information   Latrell, External Ris In - 05/30/2019  5:12 PM EDT  US-THYROID / NECK:    DEMOGRAPHICS: 64years old Female    Clinical indication:   E04.2: Nontoxic multinodular goiter    Comparison: 6/5/2017-7/26/2012. FINDINGS:    *  Right thyroid lobe:   Heterogeneous parenchyma. Lobe measures 3.8 x 1.0 x 0.7 cm. *  Nodules/Cysts:  1.  0.5 x 0.3 x 0.3 cm ovoid circumscribed hyperechoic nodule. *  Left thyroid lobe:  Heterogeneous parenchyma. Lobe measures 3.0 x 0.8 x 1.1 cm. *  Nodules/Cysts:  1.  0.7 x 0.6 x 0.2 cm ovoid circumscribed cystic lesion with no suspicious features. 2.  0.4 x 0.2 x 0.2 cm ovoid circumscribed cystic lesion with no suspicious features. 3.  0.2 x 0.2 x 0.2 cm ovoid circumscribed cystic lesion. *  Thyroid isthmus:  2 mm in thickness. *  Nodules/Cysts:  1. No nodule or cystic lesion.     IMPRESSION:   Right thyroid lobe hyperechoic nodule. Left thyroid gland grossly benign cysts. Thyroid gland size is within normal limits. Past Medical History:   Diagnosis Date    Hypertension     Hypothyroidism     PCOS (polycystic ovarian syndrome)      Patient Active Problem List    Diagnosis Date Noted    Hypothyroidism (acquired) 01/15/2018    Multinodular goiter 01/15/2018    Essential hypertension 01/15/2018    Mixed hyperlipidemia 01/15/2018    Overweight (BMI 25.0-29.9) 01/15/2018    Vitamin D deficiency 01/15/2018    PCOS (polycystic ovarian syndrome) 01/15/2018     Past Surgical History:   Procedure Laterality Date     SECTION      HAND SURGERY  2021    xiaflex injection     HYSTERECTOMY, TOTAL ABDOMINAL       Family History   Problem Relation Age of Onset    Other Mother         metabolic encephalopathy     COPD Father    Gurjit Self Cancer Father         prostate    Coronary Art Dis Brother     No Known Problems Son     No Known Problems Son      Social History     Socioeconomic History    Marital status:      Spouse name: None    Number of children: None    Years of education: None    Highest education level: None   Occupational History    None   Tobacco Use    Smoking status: Former Smoker     Packs/day: 0.50     Years: 10.00     Pack years: 5.00     Types: Cigarettes     Quit date: 1985     Years since quittin.0    Smokeless tobacco: Former User   Substance and Sexual Activity    Alcohol use: No    Drug use: No    Sexual activity: Yes     Partners: Male   Other Topics Concern    None   Social History Narrative    None     Social Determinants of Health     Financial Resource Strain:     Difficulty of Paying Living Expenses:    Food Insecurity:     Worried About Running Out of Food in the Last Year:     Ran Out of Food in the Last Year:    Transportation Needs:     Lack of Transportation (Medical):      Lack of Transportation (Non-Medical):    Physical Activity:     Days of Exercise per Week:     Minutes of Exercise per Session:    Stress:     Feeling of Stress :    Social Connections:     Frequency of Communication with Friends and Family:     Frequency of Social Gatherings with Friends and Family:     Attends Jainism Services:     Active Member of Clubs or Organizations:     Attends Club or Organization Meetings:     Marital Status:    Intimate Partner Violence:     Fear of Current or Ex-Partner:     Emotionally Abused:     Physically Abused:     Sexually Abused:      Current Outpatient Medications   Medication Sig Dispense Refill    valsartan (DIOVAN) 160 MG tablet Take 80 mg by mouth daily       ARMOUR THYROID 15 MG tablet TAKE 1/2 TABLET BY MOUTH DAILY 90 tablet 2    ARMOUR THYROID 60 MG tablet TAKE ONE TABLET BY MOUTH DAILY 90 tablet 2    metFORMIN (GLUCOPHAGE) 500 MG tablet TAKE 1/2 TABLET BY MOUTH TWICE DAILY WITH FOOD 90 tablet 3    pantoprazole (PROTONIX) 40 MG tablet Take 40 mg by mouth 3 days per week      Multiple Vitamins-Minerals (MULTIVITAMIN ADULT PO) Take 1 tablet by mouth      triamterene-hydrochlorothiazide (MAXZIDE-25) 37.5-25 MG per tablet Take 0.5 tablets by mouth      simvastatin (ZOCOR) 10 MG tablet Take 10 mg by mouth      Calcium Carb-Cholecalciferol (CALCIUM CARBONATE-VITAMIN D3 PO) Take 1 tablet by mouth       budesonide-formoterol (SYMBICORT) 160-4.5 MCG/ACT AERO Inhale 2 puffs into the lungs      Estrogens, Conjugated (PREMARIN VA) Place vaginally      Minoxidil (ROGAINE EX) Apply topically      carvedilol (COREG) 6.25 MG tablet TAKE 1 TABLET BY MOUTH TWICE DAILY (Patient not taking: Reported on 2021)      Polyethylene Glycol 3350 (MIRALAX PO) Take by mouth (Patient not taking: Reported on 2021)       No current facility-administered medications for this visit.      Allergies   Allergen Reactions    Septra [Sulfamethoxazole-Trimethoprim] Itching     Family Status   Relation Name Status    Mother      Father  Audra Momin Son  Alive    Son  Alive       Review of Systems:  Constitutional: no fatigue, no fever, no recent weight gain, no recent weight loss, no changes in appetite  Eyes: no eye pain, no change in vision, no eye redness, no eye irritation, no double vision  Ears, nose, throat: no nasal congestion, no sore throat, no earache, no decrease in hearing, no hoarseness, no dry mouth, no sinus problems, no difficulty swallowing, no neck lumps, no dental problems, no mouth sores, no ringing in ears  Pulmonary: no shortness of breath, no wheezing, no dyspnea on exertion, has cough  Cardiovascular: no chest pain, no lower extremity edema, no orthopnea, no intermittent leg claudication, has palpitations  Gastrointestinal: no abdominal pain, no nausea, no vomiting, no diarrhea, no constipation, no heartburn, no bloating  Genitourinary: no dysuria, no urinary incontinence, no urinary hesitancy, no change in urinary frequency, no feelings of urinary urgency, no nocturia  Musculoskeletal: no joint swelling, no joint stiffness, no joint pain, no muscle cramps, no muscle pain, no bone pain, no fractures  Integument/Breast: has hair loss, no skin rashes, no skin lesions, no itching, no dry skin  Neurological: no numbness, no tingling, no weakness, no confusion, no headaches, no dizziness, no fainting, no tremors, no decrease in memory, no balance problems  Psychiatric: no anxiety, no depression, no insomnia, no change in personality, no emotional problems, no stress  Hematologic/Lymphatic: no tendency for easy bleeding, no swollen lymph nodes, no tendency for easy bruising  Immunology: no seasonal allergies, no frequent infections, no frequent illnesses  Endocrine: no temperature intolerance, has hot flashes, no hand tremor    OBJECTIVE:   There were no vitals taken for this visit.   Wt Readings from Last 3 Encounters:   09/08/20 137 lb (62.1 kg)   06/15/20 144 lb 9.6 oz (65.6 kg)   09/09/19 164 lb (74.4 kg)       OBJECTIVE:  Constitutional: no apparent distress, well developed and well nourished  Mental status: alert and awake, oriented to person, place and time, able to follow commands  Psychiatric: judgement and insight and normal, recent and remote memory are intact, mood and affect are normal  Skin: skin inspection appears normal, no significant exanthematous lesions or discoloration noted on facial skin  Head and Face: head and face inspection revealed no abnormalities, normocephalic, atraumatic  Eyes: no lid or conjunctival swelling, erythema or discharge, sclera appears normal  Ears/Nose: external inspection of ears and nose revealed no abnormalities, hearing is grossly normal  Oropharynx/Mouth/Face: lips are normal with no lesions, the voice quality was normal  Neck: neck is symmetric, no visualized mass  Pulmonary/chest: respiratory effort normal, no generalized signs of difficulty breathing or signs of respiratory distress  Musculoskeletal: normal station, normal range of motion of neck  Neurological: no facial asymmetry, normal general cortical function      Lab Review:  Lab Results   Component Value Date    TSH 0.928 09/04/2020     No results found for: FREET4     ASSESSMENT/PLAN:  1. Hypothyroidism (acquired)  Continue [place] 60 mg plus 1/2 tablet of 15 mg daily  TSH 1.6-0.308-0.928-1.5  - TSH without Reflex; Future  - T4, Free; Future  - Comprehensive Metabolic Panel; Future    2. Multinodular goiter  Small nodules and cysts, largest 9 mm  - US Head Neck Soft Tissue Thyroid; Future    3. Essential hypertension  - carvedilol (COREG) 6.25 MG tablet; TAKE 1 TABLET BY MOUTH TWICE DAILY  - triamterene-hydrochlorothiazide (MAXZIDE-25) 37.5-25 MG per tablet; Take 0.5 tablets by mouth    4. Mixed hyperlipidemia  LDL 09-57-37  - simvastatin (ZOCOR) 10 MG tablet; Take 10 mg by mouth  - Lipid Panel; Future    5. Vitamin D deficiency  25OHvitamin D 42-54.1-44.7  Continue vitamin D    6.  PCOS (polycystic ovarian syndrome)  HbA1C 5.6-5.7-5.3  Glucose 88-93  Decrease metformin to 250 mg twice a day    Reviewed and/or ordered clinical lab results Yes  Reviewed and/or ordered radiology tests Yes   Reviewed and/or ordered other diagnostic tests No  Discussed test results with performing physician No  Independently reviewed image, tracing, or specimen No  Made a decision to obtain old records Yes  Reviewed and summarized old records Yes  Has hypothyroidism, on [place]. Obtained history from other than patient No    Omar Daily was counseled regarding symptoms of thyroid  diagnosis, course and complications of disease if inadequately treated, side effects of medications, diagnosis, treatment options, and prognosis, risks, benefits, complications, and alternatives of treatment, labs, imaging and other studies and treatment targets and goals. She understands instructions and counseling. Omar Daily is a 63 y.o. female being evaluated by a Virtual Visit (video visit) encounter, including two-way audio and video communication, in lieu of an in-person visit due to coronavirus emergency, to address concerns as mentioned in history and assessment and plan. Patient identification was verified at the start of the visit. I conducted an interview, performed a limited exam by video and educated the patient on my assessment and plan. Due to this being a TeleHealth encounter (During OVDBJ-88 public health emergency), evaluation of the following organ systems was limited: Vitals/Constitutional/EENT/Resp/CV/GI//MS/Neuro/Skin/Heme-Lymph-Imm. Pursuant to the emergency declaration under the [facility], [address] [place] [address] authority and the [employer] Act, this Virtual Visit was conducted with patient's (and/or legal guardian's) consent, to reduce the patient's risk of exposure to COVID-19 and provide necessary medical care.       The patient (and/or legal guardian) has also been advised to contact this office for worsening conditions or problems, and seek emergency medical treatment and/or call 911 if deemed necessary. Total time spent on this encounter via Telehealth (synchronous, real-time audio/visual connection): 30 min    See assessment, plan and counseling note for counseling and care coordination details. Services were provided through a video synchronous discussion virtually to substitute for in-person clinic visit. Persons participating in the telehealth service: provider - Lebron Delgado MD and patient Melany Aleman Provider was located at her office. Patient was located at home. --Lebron Delgado MD on 9/7/2021 at 11:16 AM    An electronic signature was used to authenticate this note. Return in about 1 year (around 9/7/2022) for thyroid problems, in office denisse.

## 2021-09-07 ENCOUNTER — VIRTUAL VISIT (OUTPATIENT)
Dept: ENDOCRINOLOGY | Age: 61
End: 2021-09-07
Payer: COMMERCIAL

## 2021-09-07 DIAGNOSIS — E28.2 PCOS (POLYCYSTIC OVARIAN SYNDROME): ICD-10-CM

## 2021-09-07 DIAGNOSIS — E55.9 VITAMIN D DEFICIENCY: ICD-10-CM

## 2021-09-07 DIAGNOSIS — E78.2 MIXED HYPERLIPIDEMIA: ICD-10-CM

## 2021-09-07 DIAGNOSIS — I10 ESSENTIAL HYPERTENSION: ICD-10-CM

## 2021-09-07 DIAGNOSIS — E04.2 MULTINODULAR GOITER: ICD-10-CM

## 2021-09-07 DIAGNOSIS — E03.9 HYPOTHYROIDISM (ACQUIRED): Primary | ICD-10-CM

## 2021-09-07 PROCEDURE — 99214 OFFICE O/P EST MOD 30 MIN: CPT | Performed by: INTERNAL MEDICINE

## 2021-09-07 PROCEDURE — G8427 DOCREV CUR MEDS BY ELIG CLIN: HCPCS | Performed by: INTERNAL MEDICINE

## 2021-09-07 PROCEDURE — 3017F COLORECTAL CA SCREEN DOC REV: CPT | Performed by: INTERNAL MEDICINE

## 2021-09-07 RX ORDER — VALSARTAN 160 MG/1
80 TABLET ORAL DAILY
COMMUNITY
Start: 2021-09-07 | End: 2022-09-07

## 2021-09-07 RX ORDER — THYROID,PORK 15 MG
TABLET ORAL
Qty: 90 TABLET | Refills: 3 | Status: SHIPPED | OUTPATIENT
Start: 2021-09-07 | End: 2022-09-08

## 2021-09-07 RX ORDER — THYROID 60 MG
TABLET ORAL
Qty: 90 TABLET | Refills: 3 | Status: SHIPPED | OUTPATIENT
Start: 2021-09-07 | End: 2022-09-08

## 2022-08-03 ENCOUNTER — OFFICE (OUTPATIENT)
Dept: URBAN - METROPOLITAN AREA CLINIC 17 | Facility: CLINIC | Age: 62
End: 2022-08-03

## 2022-08-03 VITALS
DIASTOLIC BLOOD PRESSURE: 82 MMHG | SYSTOLIC BLOOD PRESSURE: 120 MMHG | HEART RATE: 74 BPM | WEIGHT: 139 LBS | HEIGHT: 66 IN | OXYGEN SATURATION: 96 %

## 2022-08-03 DIAGNOSIS — K21.9 GASTRO-ESOPHAGEAL REFLUX DISEASE WITHOUT ESOPHAGITIS: ICD-10-CM

## 2022-08-03 DIAGNOSIS — K90.41 NON-CELIAC GLUTEN SENSITIVITY: ICD-10-CM

## 2022-08-03 DIAGNOSIS — R10.84 GENERALIZED ABDOMINAL PAIN: ICD-10-CM

## 2022-08-03 PROCEDURE — 99214 OFFICE O/P EST MOD 30 MIN: CPT | Performed by: PHYSICIAN ASSISTANT

## 2022-08-03 RX ORDER — DICYCLOMINE HYDROCHLORIDE 20 MG/1
TABLET ORAL
Qty: 60 | Refills: 2 | Status: ACTIVE
Start: 2022-08-03

## 2022-08-18 DIAGNOSIS — E28.2 PCOS (POLYCYSTIC OVARIAN SYNDROME): ICD-10-CM

## 2022-08-26 LAB
A/G RATIO: 1.9 (ref 1.2–2.2)
ALBUMIN SERPL-MCNC: 4.6 G/DL (ref 3.8–4.9)
ALP BLD-CCNC: 75 IU/L (ref 44–121)
ALT SERPL-CCNC: 18 IU/L (ref 0–32)
AMBIGUOUS ABBREVIATION: NORMAL
AST SERPL-CCNC: 25 IU/L (ref 0–40)
BILIRUB SERPL-MCNC: 0.7 MG/DL (ref 0–1.2)
BUN / CREAT RATIO: 12 (ref 12–28)
BUN BLDV-MCNC: 9 MG/DL (ref 8–27)
CALCIUM SERPL-MCNC: 9.6 MG/DL (ref 8.7–10.3)
CHLORIDE BLD-SCNC: 92 MMOL/L (ref 96–106)
CO2: 26 MMOL/L (ref 20–29)
CREAT SERPL-MCNC: 0.77 MG/DL (ref 0.57–1)
EGFR (CKD-EPI): 88 ML/MIN/1.73
GLOBULIN: 2.4 G/DL (ref 1.5–4.5)
GLUCOSE BLD-MCNC: 91 MG/DL (ref 65–99)
HBA1C MFR BLD: 5.6 % (ref 4.8–5.6)
POTASSIUM SERPL-SCNC: 3.6 MMOL/L (ref 3.5–5.2)
SODIUM BLD-SCNC: 134 MMOL/L (ref 134–144)
TOTAL PROTEIN: 7 G/DL (ref 6–8.5)
VITAMIN D 25-HYDROXY: 50.7 NG/ML (ref 30–100)

## 2022-09-06 ENCOUNTER — OFFICE VISIT (OUTPATIENT)
Dept: ENDOCRINOLOGY | Age: 62
End: 2022-09-06
Payer: COMMERCIAL

## 2022-09-06 VITALS
SYSTOLIC BLOOD PRESSURE: 138 MMHG | RESPIRATION RATE: 14 BRPM | HEART RATE: 65 BPM | HEIGHT: 66 IN | BODY MASS INDEX: 21.53 KG/M2 | DIASTOLIC BLOOD PRESSURE: 80 MMHG | TEMPERATURE: 98 F | WEIGHT: 134 LBS | OXYGEN SATURATION: 99 %

## 2022-09-06 DIAGNOSIS — E03.9 HYPOTHYROIDISM (ACQUIRED): ICD-10-CM

## 2022-09-06 DIAGNOSIS — I10 ESSENTIAL HYPERTENSION: Primary | ICD-10-CM

## 2022-09-06 DIAGNOSIS — E55.9 VITAMIN D DEFICIENCY: ICD-10-CM

## 2022-09-06 DIAGNOSIS — E78.2 MIXED HYPERLIPIDEMIA: ICD-10-CM

## 2022-09-06 DIAGNOSIS — E28.2 PCOS (POLYCYSTIC OVARIAN SYNDROME): ICD-10-CM

## 2022-09-06 DIAGNOSIS — E04.2 MULTINODULAR GOITER: ICD-10-CM

## 2022-09-06 LAB — TSH SERPL DL<=0.05 MIU/L-ACNC: 0.49 UIU/ML (ref 0.27–4.2)

## 2022-09-06 PROCEDURE — 99214 OFFICE O/P EST MOD 30 MIN: CPT | Performed by: INTERNAL MEDICINE

## 2022-09-06 RX ORDER — ATORVASTATIN CALCIUM 20 MG/1
20 TABLET, FILM COATED ORAL DAILY
COMMUNITY
Start: 2022-01-18 | End: 2023-01-18

## 2022-09-06 RX ORDER — ASCORBIC ACID 500 MG
500 TABLET ORAL DAILY
COMMUNITY

## 2022-09-06 RX ORDER — LORATADINE 10 MG/1
10 TABLET ORAL DAILY
COMMUNITY

## 2022-09-06 RX ORDER — THYROID 60 MG
TABLET ORAL
Qty: 90 TABLET | Refills: 3 | Status: CANCELLED | OUTPATIENT
Start: 2022-09-06

## 2022-09-06 RX ORDER — THYROID,PORK 15 MG
TABLET ORAL
Qty: 90 TABLET | Refills: 3 | Status: CANCELLED | OUTPATIENT
Start: 2022-09-06

## 2022-09-06 NOTE — PROGRESS NOTES
_____________________________________________________________________           Cervical lymph nodes: None identified. IMPRESSION:   Unchanged size of an anechoic cyst in the left thyroid lobe with a small peripheral    calcification (TR 2). ULTRASOUND THYROID, 6/23/2020 10:58 AM.         INDICATION: Thyroid nodules. COMPARISON: Report without images, performed at [facility] on 5/30/2019. TECHNIQUE: Multiple transverse and longitudinal gray scale images were obtained through the    thyroid gland. FINDINGS:         Right lobe measures 1.1 x 0.9 x 3.7 cm. Left lobe measures 0.9 x 1.1 x 3.3 cm. Isthmus is 1.3    mm in thickness. Thyroid gland is heterogeneous in echotexture without suspicious nodule. No nodules are seen    within the right lobe or the isthmus. There are 2 small cystic lesions within the left lobe. Largest nodule in the left lobe is nearly entirely cystic with a small focus of mural based    calcification and measures 5 x 6 x 7 mm. IMPRESSION:         NO SOLID OR SUSPICIOUS LESION IDENTIFIED. 2 SMALL CYSTIC NODULES IN LEFT LOBE. Right thyroid lobe hyperechoic nodule. Left thyroid gland grossly benign cysts. Thyroid gland size is within normal limits. 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 FINDINGS:    *  Right thyroid lobe:   Heterogeneous parenchyma. Lobe measures 3.8 x 1.0 x 0.7 cm. *  Nodules/Cysts:  1.  0.5 x 0.3 x 0.3 cm ovoid circumscribed hyperechoic nodule. *  Left thyroid lobe:  Heterogeneous parenchyma. Lobe measures 3.0 x 0.8 x 1.1 cm. *  Nodules/Cysts:  1.  0.7 x 0.6 x 0.2 cm ovoid circumscribed cystic lesion with no suspicious features. 2.  0.4 x 0.2 x 0.2 cm ovoid circumscribed cystic lesion with no suspicious features.   3.  0.2 x 0.2 x 0.2 cm ovoid circumscribed cystic lesion. *  Thyroid isthmus:  2 mm in thickness. *  Nodules/Cysts:  1. No nodule or cystic lesion. Other Result Information   Latrell, External Ris In - 2019  5:12 PM EDT  US-THYROID / NECK:    DEMOGRAPHICS: 64years old Female    Clinical indication:   E04.2: Nontoxic multinodular goiter    Comparison: 2017-2012. FINDINGS:    *  Right thyroid lobe:   Heterogeneous parenchyma. Lobe measures 3.8 x 1.0 x 0.7 cm. *  Nodules/Cysts:  1.  0.5 x 0.3 x 0.3 cm ovoid circumscribed hyperechoic nodule. *  Left thyroid lobe:  Heterogeneous parenchyma. Lobe measures 3.0 x 0.8 x 1.1 cm. *  Nodules/Cysts:  1.  0.7 x 0.6 x 0.2 cm ovoid circumscribed cystic lesion with no suspicious features. 2.  0.4 x 0.2 x 0.2 cm ovoid circumscribed cystic lesion with no suspicious features. 3.  0.2 x 0.2 x 0.2 cm ovoid circumscribed cystic lesion. *  Thyroid isthmus:  2 mm in thickness. *  Nodules/Cysts:  1. No nodule or cystic lesion. IMPRESSION:   Right thyroid lobe hyperechoic nodule. Left thyroid gland grossly benign cysts. Thyroid gland size is within normal limits.            Past Medical History:   Diagnosis Date    Hypertension     Hypothyroidism     PCOS (polycystic ovarian syndrome)      Patient Active Problem List    Diagnosis Date Noted    Hypothyroidism (acquired) 01/15/2018    Multinodular goiter 01/15/2018    Essential hypertension 01/15/2018    Mixed hyperlipidemia 01/15/2018    Overweight (BMI 25.0-29.9) 01/15/2018    Vitamin D deficiency 01/15/2018    PCOS (polycystic ovarian syndrome) 01/15/2018     Past Surgical History:   Procedure Laterality Date     SECTION      HAND SURGERY  2021    xiaflex injection     HYSTERECTOMY, TOTAL ABDOMINAL (CERVIX REMOVED)       Family History   Problem Relation Age of Onset    Other Mother         metabolic encephalopathy     COPD Father     Cancer Father         prostate    Coronary Art Dis Brother     No Known Problems Son No Known Problems Son      Social History     Socioeconomic History    Marital status:      Spouse name: None    Number of children: None    Years of education: None    Highest education level: None   Tobacco Use    Smoking status: Former     Packs/day: 0.50     Years: 10.00     Pack years: 5.00     Types: Cigarettes     Quit date: 1985     Years since quittin.0    Smokeless tobacco: Former   Substance and Sexual Activity    Alcohol use: No    Drug use: No    Sexual activity: Yes     Partners: Male     Current Outpatient Medications   Medication Sig Dispense Refill    atorvastatin (LIPITOR) 20 MG tablet Take 20 mg by mouth daily      loratadine (CLARITIN) 10 MG tablet Take 10 mg by mouth daily      vitamin C (ASCORBIC ACID) 500 MG tablet Take 500 mg by mouth daily      metFORMIN (GLUCOPHAGE) 500 MG tablet TAKE ONE TABLET BY MOUTH TWICE A DAY WITH MEALS 180 tablet 3    ARMOUR THYROID 15 MG tablet TAKE 1/2 TABLET BY MOUTH DAILY 90 tablet 3    ARMOUR THYROID 60 MG tablet TAKE ONE TABLET BY MOUTH DAILY 90 tablet 3    pantoprazole (PROTONIX) 40 MG tablet Take 40 mg by mouth 3 days per week      Multiple Vitamins-Minerals (MULTIVITAMIN ADULT PO) Take 1 tablet by mouth      triamterene-hydrochlorothiazide (MAXZIDE-25) 37.5-25 MG per tablet Take 0.5 tablets by mouth      Calcium Carb-Cholecalciferol (CALCIUM CARBONATE-VITAMIN D3 PO) Take 1 tablet by mouth       budesonide-formoterol (SYMBICORT) 160-4.5 MCG/ACT AERO Inhale 2 puffs into the lungs      Estrogens, Conjugated (PREMARIN VA) Place vaginally      valsartan (DIOVAN) 160 MG tablet Take 80 mg by mouth daily  (Patient not taking: Reported on 2022)      carvedilol (COREG) 6.25 MG tablet TAKE 1 TABLET BY MOUTH TWICE DAILY (Patient not taking: Reported on 2022)      simvastatin (ZOCOR) 10 MG tablet Take 10 mg by mouth (Patient not taking: Reported on 2022)      Minoxidil (ROGAINE EX) Apply topically (Patient not taking: Reported on 2022) Polyethylene Glycol 3350 (MIRALAX PO) Take by mouth (Patient not taking: No sig reported)       No current facility-administered medications for this visit.      Allergies   Allergen Reactions    Septra [Sulfamethoxazole-Trimethoprim] Itching     Family Status   Relation Name Status    Mother      Father  Alive    Brother  Alive    Son  Alive    Son  Alive       Review of Systems:  Constitutional: no fatigue, no fever, no recent weight gain, no recent weight loss, no changes in appetite  Eyes: no eye pain, no change in vision, no eye redness, no eye irritation, no double vision  Ears, nose, throat: no nasal congestion, no sore throat, no earache, no decrease in hearing, no hoarseness, no dry mouth, no sinus problems, no difficulty swallowing, no neck lumps, no dental problems, no mouth sores, no ringing in ears  Pulmonary: no shortness of breath, no wheezing, no dyspnea on exertion, has cough  Cardiovascular: no chest pain, no lower extremity edema, no orthopnea, no intermittent leg claudication, has palpitations  Gastrointestinal: no abdominal pain, no nausea, no vomiting, no diarrhea, no constipation, no heartburn, no bloating  Genitourinary: no dysuria, no urinary incontinence, no urinary hesitancy, no change in urinary frequency, no feelings of urinary urgency, no nocturia  Musculoskeletal: no joint swelling, no joint stiffness, no joint pain, no muscle cramps, no muscle pain, no bone pain, no fractures  Integument/Breast: has hair loss, no skin rashes, no skin lesions, no itching, no dry skin  Neurological: no numbness, no tingling, no weakness, no confusion, no headaches, no dizziness, no fainting, no tremors, no decrease in memory, no balance problems  Psychiatric: no anxiety, no depression, no insomnia, no change in personality, no emotional problems, no stress  Hematologic/Lymphatic: no tendency for easy bleeding, no swollen lymph nodes, no tendency for easy bruising  Immunology: no seasonal allergies, no frequent infections, no frequent illnesses  Endocrine: no temperature intolerance, has hot flashes, no hand tremor    OBJECTIVE:   /80   Pulse 65   Temp 98 °F (36.7 °C)   Resp 14   Ht 5' 6\" (1.676 m)   Wt 134 lb (60.8 kg)   SpO2 99%   BMI 21.63 kg/m²   Wt Readings from Last 3 Encounters:   09/06/22 134 lb (60.8 kg)   09/08/20 137 lb (62.1 kg)   06/15/20 144 lb 9.6 oz (65.6 kg)       Physical Exam:  Constitutional: no acute distress, well appearing, well nourished  Psychiatric: oriented to person, place and time, judgement, insight and normal, recent and remote memory and intact and mood, affect are normal  Skin: skin and subcutaneous tissue is normal without mass, normal turgor  Head and Face: examination of head and face revealed no abnormalities  Eyes: no lid or conjunctival swelling, no erythema or discharge, pupils are normal, equal, round, and reactive to light  Ears/Nose: external inspection of ears and nose revealed no abnormalities, hearing is grossly normal  Oropharynx/Mouth/Face: lips, tongue and gums are normal with no lesions, the voice quality was normal  Neck: neck is supple and symmetric, with midline trachea and no masses, thyroid is normal  Lymphatics: normal cervical lymph nodes, normal supraclavicular nodes  Pulmonary: no increased work of breathing or signs of respiratory distress, lungs are clear to auscultation  Cardiovascular: normal heart rate and rhythm, normal S1 and S2, no murmurs and pedal pulses and 2+ bilaterally, No edema  Abdomen: abdomen is soft, non-tender with no masses  Musculoskeletal: normal gait and station, exam of the digits and nails are normal  Neurological: normal coordination, normal general cortical function    Lab Review:  Lab Results   Component Value Date/Time    TSH 0.928 09/04/2020 08:22 AM     No results found for: FREET4     ASSESSMENT/PLAN:    1. Hypothyroidism (acquired)  Call for results, then adjust medication and send Rx.   Continue [place] 60 mg plus 1/2 tablet of 15 mg daily  TSH 1.6-0.308-0.928  - TSH without Reflex; Future  - T4, Free; Future  - Comprehensive Metabolic Panel; Future     2. Multinodular goiter  8/2022 Thyroid sonogram - 0.9 cm left cystic lesion  6/2021 Thyroid sonogram - small nodules and cysts  - US Head Neck Soft Tissue Thyroid; Future     3. Essential hypertension     - carvedilol (COREG) 6.25 MG tablet; TAKE 1 TABLET BY MOUTH TWICE DAILY  - triamterene-hydrochlorothiazide (MAXZIDE-25) 37.5-25 MG per tablet; Take 0.5 tablets by mouth     4. Mixed hyperlipidemia  LDL 76-45-44  - simvastatin (ZOCOR) 10 MG tablet; Take 10 mg by mouth  - Lipid Panel; Future     5. Vitamin D deficiency  25OHvitamin D 42-54.1-50.7  Continue vitamin D     6. PCOS (polycystic ovarian syndrome)  HbA1C 5.6-5.7-5.3-5.6  Glucose 88-91  Metformin 500 mg twice a day    Reviewed and/or ordered clinical lab results Yes  Reviewed and/or ordered radiology tests Yes   Reviewed and/or ordered other diagnostic tests No  Discussed test results with performing physician No  Independently reviewed image, tracing, or specimen No  Made a decision to obtain old records Yes  Reviewed and summarized old records Yes  Has hypothyroidism, on [place]al. Obtained history from other than patient No    Thong Killer was counseled regarding symptoms of thyroid  diagnosis, course and complications of disease if inadequately treated, side effects of medications, diagnosis, treatment options, and prognosis, risks, benefits, complications, and alternatives of treatment, labs, imaging and other studies and treatment targets and goals. She understands instructions and counseling. Return in about 1 year (around 9/6/2023) for thyroid problems.

## 2022-09-27 ENCOUNTER — PATIENT MESSAGE (OUTPATIENT)
Dept: ENDOCRINOLOGY | Age: 62
End: 2022-09-27

## 2022-09-27 DIAGNOSIS — E28.2 PCOS (POLYCYSTIC OVARIAN SYNDROME): ICD-10-CM

## 2022-09-27 NOTE — TELEPHONE ENCOUNTER
Okay to decrease metformin to 500 mg half tablet twice a day. The other option would be to change metformin to extended release metformin and take 500 mg daily. Let me know.

## 2022-09-28 NOTE — TELEPHONE ENCOUNTER
FERCHO  Thank you Lolita I will take the 1/2 500 mg tablet twice a day. I have taken the extended release before and my body didn't tolerate it.

## 2023-05-13 DIAGNOSIS — E04.2 MULTINODULAR GOITER: ICD-10-CM

## 2023-05-13 DIAGNOSIS — E28.2 PCOS (POLYCYSTIC OVARIAN SYNDROME): ICD-10-CM

## 2023-05-13 DIAGNOSIS — E78.2 MIXED HYPERLIPIDEMIA: ICD-10-CM

## 2023-05-13 DIAGNOSIS — E55.9 VITAMIN D DEFICIENCY: ICD-10-CM

## 2023-05-13 DIAGNOSIS — E03.9 HYPOTHYROIDISM (ACQUIRED): ICD-10-CM

## 2023-05-15 RX ORDER — THYROID 60 MG
TABLET ORAL
Qty: 90 TABLET | Refills: 0 | Status: SHIPPED | OUTPATIENT
Start: 2023-05-15

## 2023-07-25 DIAGNOSIS — E03.9 HYPOTHYROIDISM (ACQUIRED): ICD-10-CM

## 2023-07-25 DIAGNOSIS — E04.2 MULTINODULAR GOITER: ICD-10-CM

## 2023-07-25 DIAGNOSIS — E55.9 VITAMIN D DEFICIENCY: ICD-10-CM

## 2023-07-25 DIAGNOSIS — E78.2 MIXED HYPERLIPIDEMIA: ICD-10-CM

## 2023-07-25 DIAGNOSIS — E28.2 PCOS (POLYCYSTIC OVARIAN SYNDROME): ICD-10-CM

## 2023-07-26 RX ORDER — THYROID 60 MG
TABLET ORAL
Qty: 90 TABLET | Refills: 0 | Status: SHIPPED | OUTPATIENT
Start: 2023-07-26

## 2023-08-01 DIAGNOSIS — E28.2 PCOS (POLYCYSTIC OVARIAN SYNDROME): ICD-10-CM

## 2023-08-08 DIAGNOSIS — E78.2 MIXED HYPERLIPIDEMIA: ICD-10-CM

## 2023-08-08 DIAGNOSIS — E28.2 PCOS (POLYCYSTIC OVARIAN SYNDROME): ICD-10-CM

## 2023-08-08 DIAGNOSIS — E03.9 HYPOTHYROIDISM (ACQUIRED): ICD-10-CM

## 2023-08-08 DIAGNOSIS — E04.2 MULTINODULAR GOITER: ICD-10-CM

## 2023-08-08 DIAGNOSIS — E55.9 VITAMIN D DEFICIENCY: ICD-10-CM

## 2023-08-08 RX ORDER — THYROID 60 MG
TABLET ORAL
Qty: 90 TABLET | Refills: 0 | OUTPATIENT
Start: 2023-08-08

## 2023-08-09 DIAGNOSIS — E28.2 PCOS (POLYCYSTIC OVARIAN SYNDROME): ICD-10-CM

## 2023-08-20 LAB
A/G RATIO: 2.5 (ref 1.2–2.2)
ALBUMIN SERPL-MCNC: 5 G/DL (ref 3.9–4.9)
ALP BLD-CCNC: 82 IU/L (ref 44–121)
ALT SERPL-CCNC: 19 IU/L (ref 0–32)
AMBIGUOUS ABBREVIATION: NORMAL
AST SERPL-CCNC: 26 IU/L (ref 0–40)
BILIRUB SERPL-MCNC: 1 MG/DL (ref 0–1.2)
BUN / CREAT RATIO: 18 (ref 12–28)
BUN BLDV-MCNC: 13 MG/DL (ref 8–27)
CALCIUM SERPL-MCNC: 9.8 MG/DL (ref 8.7–10.3)
CHLORIDE BLD-SCNC: 92 MMOL/L (ref 96–106)
CO2: 26 MMOL/L (ref 20–29)
CREAT SERPL-MCNC: 0.74 MG/DL (ref 0.57–1)
ESTIMATED GLOMERULAR FILTRATION RATE CREATININE EQUATION: 92 ML/MIN/1.73
GLOBULIN: 2 G/DL (ref 1.5–4.5)
GLUCOSE BLD-MCNC: 87 MG/DL (ref 70–99)
HBA1C MFR BLD: 5.4 % (ref 4.8–5.6)
POTASSIUM SERPL-SCNC: 4.1 MMOL/L (ref 3.5–5.2)
SODIUM BLD-SCNC: 134 MMOL/L (ref 134–144)
T3 FREE: 2.9 PG/ML (ref 2–4.4)
T4 FREE: 1.17 NG/DL (ref 0.82–1.77)
TOTAL PROTEIN: 7 G/DL (ref 6–8.5)
TSH SERPL DL<=0.05 MIU/L-ACNC: 0.96 UIU/ML (ref 0.45–4.5)
VITAMIN D 25-HYDROXY: 52.8 NG/ML (ref 30–100)

## 2023-09-05 ENCOUNTER — OFFICE VISIT (OUTPATIENT)
Dept: ENDOCRINOLOGY | Age: 63
End: 2023-09-05

## 2023-09-05 VITALS
DIASTOLIC BLOOD PRESSURE: 81 MMHG | SYSTOLIC BLOOD PRESSURE: 137 MMHG | RESPIRATION RATE: 14 BRPM | HEIGHT: 66 IN | OXYGEN SATURATION: 100 % | WEIGHT: 138 LBS | HEART RATE: 65 BPM | BODY MASS INDEX: 22.18 KG/M2 | TEMPERATURE: 98 F

## 2023-09-05 DIAGNOSIS — E78.2 MIXED HYPERLIPIDEMIA: ICD-10-CM

## 2023-09-05 DIAGNOSIS — E04.2 MULTINODULAR GOITER: ICD-10-CM

## 2023-09-05 DIAGNOSIS — I10 ESSENTIAL HYPERTENSION: ICD-10-CM

## 2023-09-05 DIAGNOSIS — E03.9 HYPOTHYROIDISM (ACQUIRED): ICD-10-CM

## 2023-09-05 DIAGNOSIS — E55.9 VITAMIN D DEFICIENCY: ICD-10-CM

## 2023-09-05 DIAGNOSIS — E28.2 PCOS (POLYCYSTIC OVARIAN SYNDROME): Primary | ICD-10-CM

## 2023-09-05 PROCEDURE — 3075F SYST BP GE 130 - 139MM HG: CPT | Performed by: INTERNAL MEDICINE

## 2023-09-05 PROCEDURE — 99214 OFFICE O/P EST MOD 30 MIN: CPT | Performed by: INTERNAL MEDICINE

## 2023-09-05 PROCEDURE — 3079F DIAST BP 80-89 MM HG: CPT | Performed by: INTERNAL MEDICINE

## 2023-09-05 RX ORDER — THYROID,PORK 15 MG
TABLET ORAL
Qty: 45 TABLET | Refills: 3 | Status: SHIPPED | OUTPATIENT
Start: 2023-09-05

## 2023-09-05 RX ORDER — THYROID 60 MG
60 TABLET ORAL DAILY
Qty: 90 TABLET | Refills: 3 | Status: SHIPPED | OUTPATIENT
Start: 2023-09-05

## 2023-09-05 RX ORDER — POTASSIUM CHLORIDE 20 MEQ/1
20 TABLET, EXTENDED RELEASE ORAL DAILY
Qty: 30 TABLET | Refills: 11 | COMMUNITY
Start: 2023-07-31 | End: 2024-07-30

## 2023-09-05 NOTE — PROGRESS NOTES
SUBJECTIVE:  Anna Lockwood is a 66 y.o. female who is here for hypothyroidism. 1. Hypothyroidism (acquired)     This started in 2012. Patient was diagnosed with hypothyroidism. The problem has been unchanged. Patient started medication in 2012. Currently patient is on: [place] 60 mg plus 1/2 tab of 15 mg. Misses  0 doses a month. Feels better gluten free  Current complaints: denies fatigue, weight changes, cold intolerance. Constipation resolved when gluten free. 2. Multinodular goiter     History of obstructive symptoms: difficulty swallowing No, changes in voice/hoarseness No.  History of radiation to patient's neck: No  Resent iodine exposure: No  Family history includes no thyroid abnormalities. Family history of thyroid cancer: No     3. Essential hypertension  No headaches. 4. Mixed hyperlipidemia  No muscle pain. Mother had stroke in her 45s  No premature MI or CVA    5. Vitamin D deficiency  No bone pain. Gluten free, fells better     6. PCOS  Has hirsutism    8/18/2022  EXAM: US THYROID OR PARATHYROID       CLINICAL: Multinodular goiter, ,       COMPARISON: Thyroid ultrasound 6/9/2021       TECHNIQUE: Grayscale and Doppler images of the thyroid gland were obtained. FINDINGS:        The right thyroid lobe measures 1.1 x 3.8 x 1.1 cm. The left thyroid lobe measures 0.7 x 3.2 x    1.1 cm. The thyroid isthmus measures 1.3 mm. The thyroid tissue is homogeneous in echotexture. RIGHT LOBE:   No discrete nodule       LEFT LOBE:   _________________________________________________________       Nodule # 1       Location: Superior left thyroid lobe   Size 0.9 cm maximal diameter, unchanged   Composition:  (0)  Cystic or almost completely cystic   Echogenicity:  (0) Anechoic. Shape:  (0) Wider than tall. Margin:  (0) Smooth   Echogenic foci: (2) Peripheral calcifications.    Total points: 2       Ti-RADS classification and recommendation:   TR2:  No further action required

## 2024-04-07 DIAGNOSIS — E28.2 PCOS (POLYCYSTIC OVARIAN SYNDROME): ICD-10-CM

## 2024-04-08 NOTE — TELEPHONE ENCOUNTER
Medication:   Requested Prescriptions     Pending Prescriptions Disp Refills    metFORMIN (GLUCOPHAGE) 500 MG tablet 180 tablet 3     Sig: Take 1 tablet by mouth 2 times daily (with meals)       Last Filled:      Patient Phone Number: [phone] (home)     Last appt: 9/5/2023   Next appt: 9/5/2024    Last Labs DM:   Lab Results   Component Value Date/Time    LABA1C 5.4 08/19/2023 11:12 AM

## 2024-08-19 ENCOUNTER — TELEPHONE (OUTPATIENT)
Dept: ENDOCRINOLOGY | Age: 64
End: 2024-08-19

## 2024-08-26 ENCOUNTER — TELEPHONE (OUTPATIENT)
Dept: ENDOCRINOLOGY | Age: 64
End: 2024-08-26

## 2024-09-05 ENCOUNTER — OFFICE VISIT (OUTPATIENT)
Dept: ENDOCRINOLOGY | Age: 64
End: 2024-09-05
Payer: COMMERCIAL

## 2024-09-05 VITALS
TEMPERATURE: 98 F | RESPIRATION RATE: 14 BRPM | OXYGEN SATURATION: 98 % | BODY MASS INDEX: 21.86 KG/M2 | SYSTOLIC BLOOD PRESSURE: 160 MMHG | WEIGHT: 136 LBS | HEART RATE: 58 BPM | HEIGHT: 66 IN | DIASTOLIC BLOOD PRESSURE: 93 MMHG

## 2024-09-05 DIAGNOSIS — I10 ESSENTIAL HYPERTENSION: ICD-10-CM

## 2024-09-05 DIAGNOSIS — E78.2 MIXED HYPERLIPIDEMIA: ICD-10-CM

## 2024-09-05 DIAGNOSIS — E04.1 THYROID NODULE: ICD-10-CM

## 2024-09-05 DIAGNOSIS — E28.2 PCOS (POLYCYSTIC OVARIAN SYNDROME): ICD-10-CM

## 2024-09-05 DIAGNOSIS — E55.9 VITAMIN D DEFICIENCY: ICD-10-CM

## 2024-09-05 DIAGNOSIS — E03.9 HYPOTHYROIDISM (ACQUIRED): Primary | ICD-10-CM

## 2024-09-05 PROCEDURE — 3077F SYST BP >= 140 MM HG: CPT | Performed by: INTERNAL MEDICINE

## 2024-09-05 PROCEDURE — 3080F DIAST BP >= 90 MM HG: CPT | Performed by: INTERNAL MEDICINE

## 2024-09-05 PROCEDURE — 99214 OFFICE O/P EST MOD 30 MIN: CPT | Performed by: INTERNAL MEDICINE

## 2024-09-05 RX ORDER — THYROID,PORK 15 MG
TABLET ORAL
Qty: 45 TABLET | Refills: 3 | Status: SHIPPED | OUTPATIENT
Start: 2024-09-05

## 2024-09-05 RX ORDER — THYROID 60 MG
60 TABLET ORAL DAILY
Qty: 90 TABLET | Refills: 3 | Status: SHIPPED | OUTPATIENT
Start: 2024-09-05

## 2024-09-05 RX ORDER — LOSARTAN POTASSIUM 50 MG/1
50 TABLET ORAL DAILY
COMMUNITY
Start: 2023-12-20

## 2024-09-05 NOTE — PROGRESS NOTES
SUBJECTIVE:  Chen Suarez is a 64 y.o. female who is here for hypothyroidism.     1. Hypothyroidism (acquired)     This started in 2012. Patient was diagnosed with hypothyroidism. The problem has been unchanged.  Patient started medication in 2012. Currently patient is on: [place] 60 mg plus 1/2 tab of 15 mg. Misses  0 doses a month.  Feels better gluten free  Current complaints: denies fatigue, weight changes, cold intolerance.  Constipation resolved when gluten free.     2. Thyroid nodule  History of obstructive symptoms: difficulty swallowing No, changes in voice/hoarseness No.  History of radiation to patient's neck: No  Resent iodine exposure: No  Family history includes no thyroid abnormalities.  Family history of thyroid cancer: No     3. Essential hypertension  No headaches.     4. Mixed hyperlipidemia  No muscle pain.  Mother had stroke in her 40s  No premature MI or CVA    5. Vitamin D deficiency  No bone pain.  Gluten free, fells better     6. PCOS  Has hirsutism    8/18/2022  EXAM: US THYROID OR PARATHYROID       CLINICAL: Multinodular goiter, ,       COMPARISON: Thyroid ultrasound 6/9/2021       TECHNIQUE: Grayscale and Doppler images of the thyroid gland were obtained.        FINDINGS:        The right thyroid lobe measures 1.1 x 3.8 x 1.1 cm. The left thyroid lobe measures 0.7 x 3.2 x    1.1 cm. The thyroid isthmus measures 1.3 mm. The thyroid tissue is homogeneous in echotexture.       RIGHT LOBE:   No discrete nodule       LEFT LOBE:   _________________________________________________________       Nodule # 1       Location: Superior left thyroid lobe   Size 0.9 cm maximal diameter, unchanged   Composition:  (0)  Cystic or almost completely cystic   Echogenicity:  (0) Anechoic.   Shape:  (0) Wider than tall.   Margin:  (0) Smooth   Echogenic foci: (2) Peripheral calcifications.   Total points: 2       Ti-RADS classification and recommendation:   TR2:  No further action required

## 2024-09-27 NOTE — TELEPHONE ENCOUNTER
PT called I gave her the message from Dr. Vicki Lujan. She was taking the Full 60mg and the half tablet. She currently does not need any refills of the Armourthyroid. She stated her understanding of the message. She has also requested that she be given a call if her lab results are not good.  If they are fine, no call needed Patient's grandmother Mervat, called and is requesting provider call back in regard to patient's medication and discuss an incident that happened last evening.   Writer confirmed phone number [phone]

## 2024-10-10 ENCOUNTER — TELEPHONE (OUTPATIENT)
Dept: ENDOCRINOLOGY | Age: 64
End: 2024-10-10

## 2025-08-01 ENCOUNTER — TELEPHONE (OUTPATIENT)
Dept: ENDOCRINOLOGY | Age: 65
End: 2025-08-01

## 2025-08-01 LAB
ALBUMIN: 4.3 G/DL (ref 3.9–4.9)
ALP BLD-CCNC: 82 IU/L (ref 44–121)
ALT SERPL-CCNC: 21 IU/L (ref 0–32)
AMBIGUOUS ABBREVIATION: NORMAL
AST SERPL-CCNC: 31 IU/L (ref 0–40)
BILIRUB SERPL-MCNC: 0.9 MG/DL (ref 0–1.2)
BUN / CREAT RATIO: 15 (ref 12–28)
BUN BLDV-MCNC: 12 MG/DL (ref 8–27)
CALCIUM SERPL-MCNC: 9.8 MG/DL (ref 8.7–10.3)
CHLORIDE BLD-SCNC: 99 MMOL/L (ref 96–106)
CO2: 24 MMOL/L (ref 20–29)
CREAT SERPL-MCNC: 0.78 MG/DL (ref 0.57–1)
ESTIMATED GLOMERULAR FILTRATION RATE CREATININE EQUATION: 85 ML/MIN/1.73
GLOBULIN: 2.6 G/DL (ref 1.5–4.5)
GLUCOSE BLD-MCNC: 87 MG/DL (ref 70–99)
POTASSIUM SERPL-SCNC: 4.1 MMOL/L (ref 3.5–5.2)
SODIUM BLD-SCNC: 139 MMOL/L (ref 134–144)
T3 FREE: 2.7 PG/ML (ref 2–4.4)
T4 FREE: 0.98 NG/DL (ref 0.82–1.77)
TOTAL PROTEIN: 6.9 G/DL (ref 6–8.5)
TSH SERPL DL<=0.05 MIU/L-ACNC: 0.91 UIU/ML (ref 0.45–4.5)
VITAMIN D 25-HYDROXY: 50.9 NG/ML (ref 30–100)

## 2025-08-06 ENCOUNTER — PATIENT MESSAGE (OUTPATIENT)
Dept: ENDOCRINOLOGY | Age: 65
End: 2025-08-06

## 2025-08-06 DIAGNOSIS — E28.2 PCOS (POLYCYSTIC OVARIAN SYNDROME): ICD-10-CM

## 2025-08-20 ENCOUNTER — TELEPHONE (OUTPATIENT)
Dept: ENDOCRINOLOGY | Age: 65
End: 2025-08-20

## 2025-08-29 DIAGNOSIS — E55.9 VITAMIN D DEFICIENCY: ICD-10-CM

## 2025-08-29 DIAGNOSIS — E04.1 THYROID NODULE: ICD-10-CM

## 2025-08-29 DIAGNOSIS — E28.2 PCOS (POLYCYSTIC OVARIAN SYNDROME): ICD-10-CM

## 2025-08-29 DIAGNOSIS — E03.9 HYPOTHYROIDISM (ACQUIRED): ICD-10-CM

## 2025-08-29 DIAGNOSIS — E78.2 MIXED HYPERLIPIDEMIA: ICD-10-CM

## 2025-09-02 RX ORDER — THYROID,PORK 15 MG
7.5 TABLET ORAL DAILY
Qty: 45 TABLET | Refills: 0 | Status: SHIPPED | OUTPATIENT
Start: 2025-09-02